# Patient Record
Sex: MALE | Race: BLACK OR AFRICAN AMERICAN | NOT HISPANIC OR LATINO | URBAN - METROPOLITAN AREA
[De-identification: names, ages, dates, MRNs, and addresses within clinical notes are randomized per-mention and may not be internally consistent; named-entity substitution may affect disease eponyms.]

---

## 2018-04-11 ENCOUNTER — IMPORTED ENCOUNTER (OUTPATIENT)
Dept: URBAN - METROPOLITAN AREA CLINIC 9 | Facility: CLINIC | Age: 53
End: 2018-04-11

## 2018-04-11 PROBLEM — E11.9: Noted: 2018-04-11

## 2018-04-11 PROBLEM — H35.3131: Noted: 2018-04-11

## 2019-07-08 NOTE — PATIENT DISCUSSION
(J80.196) Vitreous degeneration, bilateral - Assesment : Examination revealed PVD. Floaters present OU - Plan : Monitor for changes. Advised patient to call our office with decreased vision or an increase in flashes and/or floaters.

## 2019-07-08 NOTE — PATIENT DISCUSSION
(C52.665) Keratoconjunct sicca, not specified as Sjogren's, bilateral - Assesment : Examination revealed Dry Eye Syndrome Patient uses refresh - Plan : Monitor for changes. Advised patient to call our office with decreased vision or increased symptoms.

## 2021-06-23 NOTE — PATIENT DISCUSSION
Patient to meet with surgical counselor today.  If not electing surgery then, return in [address]

## 2021-07-28 ENCOUNTER — PREPPED CHART (OUTPATIENT)
Dept: URBAN - METROPOLITAN AREA CLINIC 17 | Facility: CLINIC | Age: 56
End: 2021-07-28

## 2021-10-14 NOTE — PATIENT DISCUSSION
Advised patient to return for minor procedure of dilation and flushing of the lower punctum.    Can be performed in exam room.

## 2021-10-16 ASSESSMENT — VISUAL ACUITY
OS_CC: 20/20 SN
OD_CC: 20/20 - SN

## 2021-10-16 ASSESSMENT — TONOMETRY
OD_IOP_MMHG: 17
OS_IOP_MMHG: 18

## 2021-10-21 NOTE — PATIENT DISCUSSION
Advised patient to return in November for follow up. If patient becomes more symptomatic at that time Punctal dilation will be performed on the left puncta.

## 2021-10-21 NOTE — PROCEDURE NOTE: CLINICAL
PROCEDURE NOTE: Dilation of Lacrimal Punctum, With or Without Irrigation #2 Bilateral Lower Lids. Diagnosis: Epiphora Due to Insufficient Drainage.

## 2021-10-21 NOTE — PATIENT DISCUSSION
Left lower punctum appeared to be more open at this time and patients symptoms were minimum.  No dilation performed.

## 2022-01-18 ASSESSMENT — VISUAL ACUITY
OD_SC: 20/30
OS_SC: 20/30

## 2022-01-18 ASSESSMENT — TONOMETRY
OD_IOP_MMHG: 15
OS_IOP_MMHG: 15

## 2022-02-01 ENCOUNTER — ESTABLISHED PATIENT (OUTPATIENT)
Dept: URBAN - METROPOLITAN AREA CLINIC 17 | Facility: CLINIC | Age: 57
End: 2022-02-01

## 2022-02-01 DIAGNOSIS — H35.3131: ICD-10-CM

## 2022-02-01 DIAGNOSIS — E11.9: ICD-10-CM

## 2022-02-01 PROCEDURE — 92014 COMPRE OPH EXAM EST PT 1/>: CPT

## 2022-02-01 PROCEDURE — 92134 CPTRZ OPH DX IMG PST SGM RTA: CPT

## 2022-02-01 ASSESSMENT — VISUAL ACUITY
OS_CC: 20/20
OU_CC: 20/20
OD_CC: 20/20

## 2022-02-01 ASSESSMENT — TONOMETRY
OD_IOP_MMHG: 13
OS_IOP_MMHG: 14

## 2023-08-29 ENCOUNTER — ESTABLISHED PATIENT (OUTPATIENT)
Dept: URBAN - METROPOLITAN AREA CLINIC 17 | Facility: CLINIC | Age: 58
End: 2023-08-29

## 2023-08-29 DIAGNOSIS — E11.9: ICD-10-CM

## 2023-08-29 DIAGNOSIS — H35.371: ICD-10-CM

## 2023-08-29 DIAGNOSIS — H35.3131: ICD-10-CM

## 2023-08-29 PROCEDURE — 92134 CPTRZ OPH DX IMG PST SGM RTA: CPT

## 2023-08-29 PROCEDURE — 99214 OFFICE O/P EST MOD 30 MIN: CPT

## 2023-08-29 ASSESSMENT — TONOMETRY
OS_IOP_MMHG: 11
OD_IOP_MMHG: 11

## 2023-08-29 ASSESSMENT — VISUAL ACUITY
OD_CC: 20/20-1
OU_CC: 20/20
OS_CC: 20/20

## 2024-08-26 ENCOUNTER — COMPREHENSIVE EXAM (OUTPATIENT)
Dept: URBAN - METROPOLITAN AREA CLINIC 17 | Facility: CLINIC | Age: 59
End: 2024-08-26

## 2024-08-26 DIAGNOSIS — E11.9: ICD-10-CM

## 2024-08-26 DIAGNOSIS — H35.371: ICD-10-CM

## 2024-08-26 DIAGNOSIS — H35.3131: ICD-10-CM

## 2024-08-26 DIAGNOSIS — H52.4: ICD-10-CM

## 2024-08-26 PROCEDURE — 92014 COMPRE OPH EXAM EST PT 1/>: CPT

## 2024-08-26 PROCEDURE — 92015 DETERMINE REFRACTIVE STATE: CPT

## 2024-08-26 PROCEDURE — 92134 CPTRZ OPH DX IMG PST SGM RTA: CPT

## 2024-08-26 ASSESSMENT — TONOMETRY
OD_IOP_MMHG: 20
OS_IOP_MMHG: 20

## 2024-08-26 ASSESSMENT — VISUAL ACUITY
OD_SC: 20/25
OS_SC: 20/20